# Patient Record
Sex: FEMALE | Race: WHITE | Employment: FULL TIME | URBAN - METROPOLITAN AREA
[De-identification: names, ages, dates, MRNs, and addresses within clinical notes are randomized per-mention and may not be internally consistent; named-entity substitution may affect disease eponyms.]

---

## 2018-11-17 RX ORDER — SERTRALINE HYDROCHLORIDE 50 MG/1
50 TABLET, FILM COATED ORAL DAILY
Qty: 30 TAB | Refills: 0 | Status: SHIPPED | OUTPATIENT
Start: 2018-11-17 | End: 2022-09-06 | Stop reason: ALTCHOICE

## 2020-11-25 ENCOUNTER — VIRTUAL VISIT (OUTPATIENT)
Dept: SURGERY | Age: 45
End: 2020-11-25
Payer: COMMERCIAL

## 2020-11-25 VITALS — HEIGHT: 65 IN | WEIGHT: 293 LBS | BODY MASS INDEX: 48.82 KG/M2

## 2020-11-25 DIAGNOSIS — E66.01 MORBID OBESITY WITH BODY MASS INDEX (BMI) OF 50.0 TO 59.9 IN ADULT (HCC): Primary | ICD-10-CM

## 2020-11-25 PROCEDURE — 99203 OFFICE O/P NEW LOW 30 MIN: CPT | Performed by: SURGERY

## 2020-11-25 RX ORDER — NORETHINDRONE 0.35 MG/1
TABLET ORAL
COMMUNITY
Start: 2020-08-27

## 2020-11-25 NOTE — PROGRESS NOTES
Bariatric Surgery Consult    Audrey Tolliver is a 38 y.o. female with a history of morbid obesity. Her Height: 5' 5\" (165.1 cm), Weight: 315 lb (142.9 kg). Body mass index is 52.42 kg/m². She reports that she has been trying to lose weight for 5 years. Her maximum weight was 315 pounds. She has attended our bariatric surgery information seminar. Gloria Mcconnell wants to consider laparoscopic gastric bypass surgery and laparoscopic sleeve gastrectomy. Pt is referred by:  Rosa Maria Cardoza MD.    Dietary History:   The patient says that in the past, physician supervised, behavior modification, unsupervised diets and Weight Watchers have not resulted in real success. When asked why she was not able to achieve or maintain significant weight loss she replied, \" she has tried to lose weight many times is been able to lose about 20 to 30 pounds with other weight loss attempts but not been able to keep the weight off any more than a few months to year. \". Number of meals per day: 3  Portion size: moderate  Snacks: A few snacks per day     Comorbidities:     Bariatric comorbidities present: chronic back problems    Ambulatory status: independent    The patient's reported level of exercise: moderately active.       Patient Active Problem List    Diagnosis Date Noted    Pregnancy 06/08/2015    Ectopic pregnancy 04/07/2014    Nausea and vomiting in pregnancy 04/07/2014    Active labor 06/03/2012     Past Medical History:   Diagnosis Date    Chlamydia     Essential hypertension     Psychiatric problem       Past Surgical History:   Procedure Laterality Date    HX GYN      2 vaginal births    HX GYN  4/2/14    DILATATION AND CURETTAGE WITH SUCTION / SEND PATH FOR FROZEN SECTION     HX OTHER SURGICAL      ovarian cyst 2007      Social History     Tobacco Use    Smoking status: Current Some Day Smoker     Packs/day: 0.25    Smokeless tobacco: Never Used   Substance Use Topics    Alcohol use: Yes     Comment: occas Family History   Problem Relation Age of Onset    Diabetes Father     Hypertension Father     Stroke Father     Cancer Maternal Grandmother     Cancer Paternal Aunt       . Current Outpatient Medications   Medication Sig    Shanna 0.35 mg tab     sertraline (ZOLOFT) 50 mg tablet Take 1 Tab by mouth daily.  oxyCODONE-acetaminophen (PERCOCET) 5-325 mg per tablet Take 1 Tab by mouth every four (4) hours as needed for Pain. Max Daily Amount: 6 Tabs.  ibuprofen (MOTRIN) 600 mg tablet Take 1 Tab by mouth every eight (8) hours as needed for Pain.  PNV no.24-iron-folic acid-dha (PRENATAL DHA+COMPLETE PRENATAL) -300 mg-mcg-mg cmpk Take  by mouth.  calcium carbonate (TUMS) 200 mg calcium (500 mg) chew Take 2 Tabs by mouth three (3) times daily. No current facility-administered medications for this visit. Allergies   Allergen Reactions    Other Food Nausea and Vomiting     Oysters causes nausea &  vomiting         Review of Systems:    Constitutional: negative  Ears, Nose, Mouth, Throat, and Face: negative  Respiratory: negative  Cardiovascular: negative  Gastrointestinal: negative  Genitourinary:negative  Integument/Breast: negative  Hematologic/Lymphatic: negative  Musculoskeletal:negative  Neurological: negative  Behavioral/Psychiatric: negative  Endocrine: negative  Allergic/Immunologic: negative    Objective:     Visit Vitals  Ht 5' 5\" (1.651 m)   Wt 315 lb (142.9 kg)   BMI 52.42 kg/m²        Physical Exam:    No physical exam due to virtual visit  Assessment:     1. Morbid obesity (Body mass index is 52.42 kg/m².) with multiple comorbidities. The patient meets criteria established by the NIH for weight loss surgery candidates. Without weight reduction, co-morbidities will escalate as well as increase risk of early mortality. Our recommendation is the patient could be served with laparoscopic gastric bypass surgery and laparoscopic sleeve gastrectomy.  I explained to the patient differences between laparoscopic gastric bypass, laparoscopic adjustable gastric banding, and laparoscopic vertical sleeve gastrectomy with respect to expected weight loss, resolution of comorbidities and risks. Ms. Pascual has attended one our informational meetings and has seen our educational materials. She has requested Dr. Usama Perez to perform her procedure. I reviewed the role for this procedure as a tool to help her achieve her weight loss goals. I reminded her that effective weight loss comes from lifelong adherence to changes in dietary choices, eating habits and exercise. Recommendation: We will request approval for laparoscopic gastric bypass surgery and laparoscopic sleeve gastrectomy. We recommend that the patient undergo the following evaluations prior to considering surgery:    Cardiology: no  Dietician: yes  Gastroenterology: no  Psychiatry/Psychology: yes  Pulmonology: yes  Sleep Medicine: no    She is not quite sure what surgery she would like to have. I believe she would be a good candidate for either sleeve or gastric bypass. Has no GERD but we will get an upper GI study to make sure that there are no issues with the stomach or esophagus. She will start her bariatric surgery process with the psychological evaluation and nutrition evaluation. She will come back to see me in a few months and figure out which surgery she would like to have. She is also a smoker and will need to smoke free for 3 months and have cotinine testing    I was in the office while conducting this encounter. Consent:  She and/or her healthcare decision maker is aware that this patient-initiated Telehealth encounter is a billable service, with coverage as determined by her insurance carrier. She is aware that she may receive a bill and has provided verbal consent to proceed: Yes    This virtual visit was conducted via [employer] me.   Pursuant to the emergency declaration under the [address] Emergencies Act, 1135 waiver authority and the Coronavirus Preparedness and Response Supplemental Appropriations Act, this Virtual  Visit was conducted to reduce the patient's risk of exposure to COVID-19 and provide continuity of care for an established patient. Services were provided through a video synchronous discussion virtually to substitute for in-person clinic visit. Due to this being a TeleHealth evaluation, many elements of the physical examination are unable to be assessed. Total Time: minutes: 30. Signed By: Antony Waters MD     November 25, 2020       Greater than half of the time: 30 minutes was used in counciling the patient about bariatric surgery and the steps she needs to take to move forward with her surgery. Ms. Pascual has a reminder for a \"due or due soon\" health maintenance. I have asked that she contact her primary care provider for follow-up on this health maintenance.

## 2020-11-25 NOTE — PROGRESS NOTES
1. Have you been to the ER, urgent care clinic since your last visit? Hospitalized since your last visit? No     2. Have you seen or consulted any other health care providers outside of the [address] [place] [address] since your last visit? Include any pap smears or colon screening.   No

## 2021-05-03 ENCOUNTER — CLINICAL SUPPORT (OUTPATIENT)
Dept: SURGERY | Age: 46
End: 2021-05-03

## 2021-05-03 DIAGNOSIS — E66.01 MORBID OBESITY WITH BODY MASS INDEX (BMI) OF 50.0 TO 59.9 IN ADULT (HCC): Primary | ICD-10-CM

## 2021-05-03 NOTE — PROGRESS NOTES
Pre-operative Bariatric Nutrition Evaluation ()     Date: 5/3/2021   Kelsie Lewis M.D. Name: Osbaldo Roa  :  1975  Age:  41  Gender: Female   Type of Surgery: [x]           Gastric Bypass   [x]           Sleeve Gastrectomy    ASSESSMENT:    Past Medical History:depression, anxiety; smoking 1/2 ppd      Medications/Supplements:   Prior to Admission medications    Medication Sig Start Date End Date Taking? Authorizing Provider   Shanna 0.35 mg tab  20   Provider, Historical   sertraline (ZOLOFT) 50 mg tablet Take 1 Tab by mouth daily. 18   Carlo Servin MD   oxyCODONE-acetaminophen (PERCOCET) 5-325 mg per tablet Take 1 Tab by mouth every four (4) hours as needed for Pain. Max Daily Amount: 6 Tabs. 6/10/15   Omi Palmer MD   ibuprofen (MOTRIN) 600 mg tablet Take 1 Tab by mouth every eight (8) hours as needed for Pain. 6/10/15   Omi Palmer MD   PNV no.93-ejsh-akaia acid-dha (PRENATAL DHA+COMPLETE PRENATAL) -684 mg-mcg-mg cmpk Take  by mouth. Provider, Historical   calcium carbonate (TUMS) 200 mg calcium (500 mg) chew Take 2 Tabs by mouth three (3) times daily. Provider, Historical       Food Allergies/Intolerances:none    Anthropometrics:    Ht:65\"   Reported Wt: 321#    IBW: 125#    %IBW: 256%     BMI:52    Category: obesity III     Reported wt history: Pt completing pre-op nutrition evaluation for wt loss surgery over the phone d/t social distancing guidelines d/t COVID-19. Pt reports strong family h/o overweight/obesity and has struggled with her wt most of her life. Reports lowest adult BW of 168# and highest adult BW of 320#. Attributes wt gain over the years r/t family h/o obesity, sedentary lifestyle . Has attempted wt loss through various methods with most successful wt loss of 15-20#. Has been unable to maintain long term or significant wt loss and is now seeking approval for weight loss surgery.  Pt will need to complete 6 months of supervised weight loss for insurance requirements. Exercise/Physical Activity:limited d/t pain and SOB    Reported Diet History:Atkins, Weight Watchers, liquid diets, diet pills    24 Hour Diet Recall  Breakfast  Coffee, half bagel with banana; skips    Lunch  Salad; starving or eating \"whatever\" and eating quickly - pizza, chips   Dinner  Meat and vegetable; pizza, tacos, spaghetti, Ramen Noodles   Snacks  Pickles, chips, grapes, yogurt; no sweets    Beverages  1 diet soda per day, coffee, water, Crystal Light       Environment/Psychosocial/Support:Pt reports a good support system from  and they have two sons. Pt and  share grocery shopping and cooking. Pt works for [employer] and has been working from home. Pt helps care for her mother with dementia. Pt knows a few people who have had wt loss surgery. NUTRITION DIAGNOSIS:  1. Inadequate protein intake r/t skipping meals evidenced by pt with less than 60 grams protein per day. 2. Food and nutrition related knowledge deficit r/t lack of prior exposure to information evidenced by pt seeking nutrition education for wt loss surgery. NUTRITION INTERVENTION:  Pt educated on nutrition recommendations for weight loss surgery, specifically undecided. Instructed on consuming 3 meals per day starting now. Use the balanced plate method to plan meals, include 3 oz of lean source of protein, 1/2 cup whole grains, unlimited non-starchy vegetables, 1/2 cup fruit and 1 serving of low fat dairy. Utilize handouts listing healthy snack and meal ideas to limit restaurant meals. After surgery measure all meals to 1/2 cup. Each meal will contain a 1/4 cup lean protein and 1/4 cup fruit, non-starchy vegetable or starch (limiting to once per day). Aim for 60 g protein per day. Sip on 48-64 oz of sugar free, calorie free, non-carbonated beverages each day. Do not use a straw. Do not consume beverages 30 minutes before, during or 30 minutes after meals. Read all nutrition labels. Demonstrated and emphasized identifying serving size, total fat, sugar and protein content. Defined low fat as </= 3 g per serving. Discussed lean and extra lean sources of protein. Provided list of low fat cooking methods. Avoid foods with sugar listed in the first 3 ingredients and >/15 g sugar per serving. Excess sugar/fat intake may lead to dumping syndrome. Discussed signs and symptoms of dumping syndrome. Practice mindful eating habits; take small bites, chew thoroughly, avoid distractions, utilize hunger/fullness scale. Consume meals over 20-30 minutes. Attend Bariatric Support Group and increase physical activity (approved per MD) for long term weight maintenance. NUTRITION MONITORING AND EVALUATION:    The following goals were established with patient;  1. Aim for 60 grams protein per day. Eat 3 meals a day to ensure adequate protein intake. Use protein shakes. 2. Eliminate carbonated beverages. 3. Review nutrition education materials. Follow up next month for continued nutrition education and supervised weight loss. Specific tips and techniques to facilitate compliance with above recommendations were provided and discussed. Nutrition evaluation reveals important lifestyle changes indicated. Goals set and recommendations made. Will continue to assess. If further details are desired please feel free to contact me at [phone]. This phone number was also provided to the patient for any further questions or concerns.            Kenyatta Wilson RD

## 2021-06-02 ENCOUNTER — OFFICE VISIT (OUTPATIENT)
Dept: SURGERY | Age: 46
End: 2021-06-02

## 2021-06-02 DIAGNOSIS — E66.01 MORBID OBESITY WITH BODY MASS INDEX (BMI) OF 50.0 TO 59.9 IN ADULT (HCC): Primary | ICD-10-CM

## 2021-06-04 NOTE — PROGRESS NOTES
[address] [place] [address] [facility]  Supervised Weight Loss     Date:   2021    Patient's Name: Berkley Lopez  : 1975    Insurance:  [employer]              Session:   Surgery: Gastric Bypass  Surgeon:  Mark Baez M.D. Height: 65\"  Reported Weight:    318#      Lbs. BMI: 52   Pounds Lost since last month: 3#               Pounds Gained since last month: 0    Starting Weight: 321#   Previous Months Weight: 321#  Overall Pounds Lost: 3#  Overall Pounds Gained: 0    Other Pertinent Information: Today's appointment was completed in a virtual setting d/t COVID-19. Today's wt was self-reported. Smoking Status:  yes  Alcohol Intake: 1-2 drinks, 1 time per week    I have reviewed with pt the guidelines of the supervised wt loss program.  Pt understands the expectations of some wt loss during the program and that wt gain could delay the process. I have also explained that classes need to be consecutive. Missing a class may result in starting over. Pt has received this information in writing. Changes that patient has made since last month include:  Drinking more water, healthier food choices, increased veggies. Eating Habits and Behaviors  General healthy eating guidelines were discussed. A nutrition lesson specific to the importance of protein intake after surgery was provided. We discussed food sources of protein, protein supplements and multiple reasons as to why protein is important after bariatric surgery. Pts were instructed to focus on including protein at every meal and practice eating protein first at the meal. Pts were encouraged to sample a protein shake for tolerance. Patients were also instructed to use the balanced plate method for help with portion control and general healthy eating prior to surgery. We discussed measuring meals to 1/2 cup total per meal after surgery. Drinking only calorie-free, sugar-free and non-carbonated beverages.  We discussed the importance of drinking 64 ounces of fluid per day to prevent dehydration post-operatively. Patient's current diet habits include: Pt is eating 2-3 meals per day. Snack choices include carrots, celery, pickles, cheese, crackers. Pt is eating refined carbohydrate foods (bread, pasta, rice, potatoes) several times per week. Pt is eating sweets/desserts special occasions. Pt is using baked, grilled, broiled and some fried cooking methods. Pt is eating meals prepared outside of the home 1-3 times per week. Pt is drinking water, sweetened tea, diet soda, Crystal Light. Pt reports yes to emotional eating. Physical Activity/Exercise  We talked about the importance of increasing daily physical activity and beginning to develop an exercise regimen/routine. We talked about exercise as being an important part of long term weight loss after surgery. Comments:  During class, I discussed with patient the importance of getting into an exercise routine. Pt is currently walking for 20 minutes, 3 times per week for activity. Pt has been encouraged to maintain and increase as tolerated. Behavior Modification       We talked about how to eat more mindfully. Tips and recommendations for how to make these changes were provided. Pt was encouraged to keep a food journal and record what they were taking in daily. Overall Assessment: Pt demonstrates appropriate lifestyle changes evidenced by reported changes and reported wt loss. Will continue to assess. Patient-Set Goals:   1. Nutrition - protein at every meal  2. Exercise - walking 5 times per week  3.  Behavior -make time for it    Kyle Betts, MY  6/4/2021

## 2021-07-01 ENCOUNTER — OFFICE VISIT (OUTPATIENT)
Dept: SURGERY | Age: 46
End: 2021-07-01

## 2021-07-01 DIAGNOSIS — E66.01 MORBID OBESITY WITH BODY MASS INDEX (BMI) OF 50.0 TO 59.9 IN ADULT (HCC): Primary | ICD-10-CM

## 2021-08-03 ENCOUNTER — HOSPITAL ENCOUNTER (OUTPATIENT)
Dept: GENERAL RADIOLOGY | Age: 46
Discharge: HOME | End: 2021-08-03
Attending: SURGERY
Payer: COMMERCIAL

## 2021-08-03 DIAGNOSIS — E66.01 MORBID OBESITY WITH BODY MASS INDEX (BMI) OF 50.0 TO 59.9 IN ADULT (HCC): ICD-10-CM

## 2021-08-03 PROCEDURE — 74240 X-RAY XM UPR GI TRC 1CNTRST: CPT

## 2021-08-05 ENCOUNTER — OFFICE VISIT (OUTPATIENT)
Dept: SURGERY | Age: 46
End: 2021-08-05

## 2021-08-05 DIAGNOSIS — E66.01 MORBID OBESITY (HCC): Primary | ICD-10-CM

## 2021-08-05 NOTE — PROGRESS NOTES
[facility]  Supervised Weight Loss     Date:   2021    Patient's Name: Jessica Solorzano  : 1975    Insurance:  [employer]         Session:   Surgery: Gastric Bypass                  Surgeon:  Earl Bowen MD     Height: 65\"     Reported Weight:  315     Lbs. BMI: 52             Pounds Lost since last month: 4.5 lbs               Pounds Gained since last month: 0     Starting Weight: 321#                       Previous Months Weight: 319.5#  Overall Pounds Lost: 6 lbs  Overall Pounds Gained: 0    Other Pertinent Information: Today's appointment was completed in a virtual setting d/t COVID-19. Smoking Status: None  Alcohol Intake: 2 drinks once a week    I have reviewed with pt the guidelines of the supervised wt loss program.  Pt understands the expectations of some wt loss during the program and that wt gain could delay the process. I have also explained that appointments need to be consecutive and missing an appointment may result in starting over. Pt has received this information in writing. Changes that patient has made since last month include: walking, drinking water. Eating Habits and Behaviors  General healthy eating guidelines were also discussed. Pts were instructed that their plate should be made up 1/2 plate coming from non-starchy vegetables, 1/4 coming from lean meat, and 1/4 of their plate coming from carbohydrates, including fruits, starches, or milk. We discussed measuring meals to 1/2 cup total per meal after surgery. Drinking only calorie-free, sugar-free and non-carbonated beverages. We discussed the importance of drinking 64 ounces of fluid per day to prevent dehydration post-operatively. Physical Activity/Exercise  We talked about the importance of increasing daily physical activity and beginning to develop an exercise regimen/routine.  We talked about exercise as being an important part of long term weight loss after surgery. Comments:  During class, I discussed with patient the importance of getting into an exercise routine. Pt is currently walking 5x week for 20 min for activity. Pt has been encouraged to increase duration of exercise. Behavior Modification    A behavior modification lesson was provided with an emphasis on developing mindful eating behaviors. We talked about how to eat more mindfully and identify emotional eating triggers. Tips and recommendations for how to make these changes were provided. Pt was encouraged to keep a food journal and record what they were taking in daily. Patient's reported eating behaviors: emotional eating, not packing meals when away from home, skipping meals. Biggest trigger when managing weight is food choices and lack of activity. Overall Assessment: Nutrition evaluation reveals important lifestyle changes being made but more are indicated. Goals set and recommendations made. Will continue to assess    Patient-Set Goals:   1. Nutrition - continue drinking water, watch portion sizes  2. Exercise - exercise 5x week for 30 min  3.  Behavior - no emotional eating- sub in with non food activity    April Duval, MY  8/5/2021

## 2021-09-02 ENCOUNTER — OFFICE VISIT (OUTPATIENT)
Dept: SURGERY | Age: 46
End: 2021-09-02

## 2021-09-02 DIAGNOSIS — E66.01 MORBID OBESITY (HCC): Primary | ICD-10-CM

## 2021-09-03 NOTE — PROGRESS NOTES
[facility]  Supervised Weight Loss     Date:   2021    Patient's Name: Tara Sherwood  : 1975    Insurance:  [employer]         Session: Harshal Lennon  Surgery: Gastric Bypass                  Surgeon: Ann Marie Garcia MD     Height: 65\"     Reported NNARXS:  367     OLD.                            BMI: 51.7            Pounds Lost since last month: 5 lbs               Pounds Gained since last month: 0     Starting Weight: 321#                       Previous Months Weight: 315#  Overall Pounds Lost: 11 lbs  Overall Pounds Gained: 0  Other Pertinent Information: Today's appointment was completed in a virtual setting d/t [email or web address]. Smoking Status:  None-stopped smoking 3 weeks ago  Alcohol Intake: 1 drink once per week    I have reviewed with pt the guidelines of the supervised wt loss program.  Pt understands the expectations of some wt loss during the program and that wt gain could delay the process. I have also explained that appointments need to be consecutive and missing an appointment may result in starting over. Pt has received this information in writing. Changes that patient has made since last month include:  Stopped smoking, watching portion sizes, drinking water consistently . Eating Habits and Behaviors  General healthy eating guidelines were discussed. A nutrition lesson was presented on portion control. Patients were instructed implement portion control now using the balanced plate method (1/2 plate non-starchy vegetables, 1/4 plate lean meat, and 1/4 plate whole grains and to include fruit and/or milk at meals or snack). We discussed measuring meals to 1/2 cup total per meal after surgery and appropriate portion progression long term. Patient's current diet habits include: Pt is eating 3 meals per day. Snack choices include veggies, yogurt, nuts and a little chips.  Pt is eating refined carbohydrate foods (bread, pasta, rice, potatoes) 1x week. Pt is not eating sweets/desserts. Pt is using baking, grilling and broiling cooking methods. Pt is eating meals prepared outside of the home 1-3x week. Pt is drinking 64 oz water, 12 oz sugar free beverages. Pt reports some emotional eating at times. Physical Activity/Exercise  We talked about the importance of increasing daily physical activity and beginning to develop an exercise regimen/routine. We talked about exercise as being an important part of long term weight loss after surgery. Comments:  During class, I discussed with patient the importance of getting into an exercise routine. Pt is currently not exercising due to depression from losing mother and from [place] (out of breath) for activity. Pt has been encouraged to restart exercise when able. Behavior Modification    We talked about how to eat more mindfully. Tips and recommendations for how to make these changes were provided. Pt was encouraged to keep a food journal and record what they were taking in daily. Overall Assessment: Nutrition evaluation reveals important lifestyle changes being made, along with weight loss. Goals set and recommendations made. Will continue to assess    Patient-Set Goals:   1. Nutrition - drink 1 protein shake a day; watch protein amounts per day  2. Exercise - get back to walking once per day  3.  Behavior -continue to not smoke-keep smoking tracker going    Josh Roth, [address]  9/2/2021

## 2021-10-07 ENCOUNTER — OFFICE VISIT (OUTPATIENT)
Dept: SURGERY | Age: 46
End: 2021-10-07

## 2021-10-07 DIAGNOSIS — E66.01 MORBID OBESITY (HCC): Primary | ICD-10-CM

## 2021-10-12 NOTE — PROGRESS NOTES
[facility] at [facility]  Supervised Weight Loss     Date:   10/7/2021    Patient's Name: Shelbie Reddy  : 1975    Insurance:  [place]         Session:   Surgery: Gastric Bypass                  Surgeon: Maral Sorensen MD     Height: 65\"     Reported BDGWPD:  096     DGM.                            BMI: 52          Pounds Lost since last month: 0               Pounds Gained since last month: 5 lbs     Starting Weight: 321#                       Previous Months Weight: 310#  Overall Pounds Lost: 6 lbs  Overall Pounds Gained: 0    Other Pertinent Information: Today's appointment was completed in a virtual setting d/t COVID-19.        Smoking Status: None  Alcohol Intake: 2 drinks every 2 weeks    I have reviewed with pt the guidelines of the supervised wt loss program.  Pt understands the expectations of some wt loss during the program and that wt gain could delay the process. I have also explained that appointments need to be consecutive and missing an appointment may result in starting over. Pt has received this information in writing. Changes that patient has made since last month include: measuring out food portions; counting out grams of protein; working on water intake. Eating Habits and Behaviors  General healthy eating guidelines were also discussed. Pts were instructed that their plate should be made up 1/2 plate coming from non-starchy vegetables, 1/4 coming from lean meat, and 1/4 of their plate coming from carbohydrates, including fruits, starches, or milk. We discussed measuring meals to 1/2 cup total per meal after surgery. Drinking only calorie-free, sugar-free and non-carbonated beverages. We discussed the importance of drinking 64 ounces of fluid per day to prevent dehydration post-operatively. Patient's current diet habits include: Pt is eating 2-3 meals per day. Snack choices include cheese, greek yogurt and fruit.  Pt is eating refined carbohydrate foods (pasta, rice) 1x week. Pt is eating sweets/desserts 1x week. Pt is using baking, grilling and broiling cooking methods. Pt is eating meals prepared outside of the home 1x week. Pt is drinking water and sugar free beverages. Pt reports some emotional eating. Physical Activity/Exercise  An exercise presentation was provided including information about exercise programs available both before and after surgery. We talked about the importance of increasing daily physical activity and beginning to develop an exercise regimen/routine. We talked about exercise as being an important part of long term weight loss after surgery. Comments:  During class, I discussed with patient the importance of getting into an exercise routine. Pt is currently not exercising due to injured foot. Pt has been encouraged to restart exercise when foot healed. Behavior Modification       We talked about how to eat more mindfully. Tips and recommendations for how to make these changes were provided. Pt was encouraged to keep a food journal and record what they were taking in daily. Overall Assessment:     Pt demonstrates appropriate lifestyle changes evidenced by reported changes and slight weight loss over the past 6 months. Demonstrates understanding of nutrition guidelines for bariatric surgery. Appears to be an appropriate candidate at this time      Patient-Set Goals:   1. Nutrition - continue monitoring portion sizes and counting out protein  2. Exercise - walk 30 min daily when foot healed  3.  Behavior - stop emotional eating-use mindfulness; listen to body to know if really hungry    Arnold Shaw, MY  10/7/2021

## 2021-11-04 ENCOUNTER — OFFICE VISIT (OUTPATIENT)
Dept: SURGERY | Age: 46
End: 2021-11-04

## 2021-11-04 DIAGNOSIS — E66.01 MORBID OBESITY (HCC): Primary | ICD-10-CM

## 2021-11-10 NOTE — PROGRESS NOTES
[facility] Surgical Specialists at [facility]  Supervised Weight Loss     Date:   2021    Patient's Name: Sanya Laurent  : 1975    Insurance:  [place]         Session:   Surgery: Gastric Bypass                  Surgeon: Julian Mills MD     Height: 65\"     Reported PHFRWP:  819     UJY.                            BMI: 38          Pounds Lost since last month: 0               Pounds Gained since last month: 0     Starting Weight: 321#                       Previous Months Weight: 315#  Overall Pounds Lost: 6 lbs  Overall Pounds Gained: 0       Other Pertinent Information: Today's appointment was completed in a virtual setting d/t COVID-[employer] Smoking Status:  None  Alcohol Intake: 2 drinks, once per week    I have reviewed with pt the guidelines of the supervised wt loss program.  Pt understands the expectations of some wt loss during the program and that wt gain could delay the process. I have also explained that appointments need to be consecutive and missing an appointment may result in starting over. Pt has received this information in writing. Changes that patient has made since last month include: not eating in front of TV; finding things to do instead of snacking; continue to attempt to walk for exercise. Eating Habits and Behaviors  A nutrition lesson was presented on label reading with specific guidelines provided for limiting added sugars. This information will help increase healthy food choices, promote weight loss and prevent dumping syndrome after gastric bypass. We also reviewed the general nutrition guidelines for bariatric surgery. Patient's current diet habits include: Pt is eating 2-3 meals per day. Snack choices include apples, carrots, walnuts, dark chocolate chips. Pt is eating refined carbohydrate foods (chips, pretzels, pasta) 1-2x week. Pt is eating sweets/desserts occasionally.  Pt is using baking, grilling and broiling  cooking methods. Pt is eating meals prepared outside of the home 1x week. Pt is drinking water, caffeinated beverages and sugar free beverages. Pt reports emotional eating. Physical Activity/Exercise  We talked about the importance of increasing daily physical activity and beginning to develop an exercise regimen/routine. We talked about exercise as being an important part of long term weight loss after surgery. Comments:  During class, I discussed with patient the importance of getting into an exercise routine. Pt is currently walking 2x week for 20 min for activity. Pt has been encouraged to increase frequency and duration of exercise as able. Behavior Modification       We talked about how to eat more mindfully. Tips and recommendations for how to make these changes were provided. Pt was encouraged to keep a food journal and record what they were taking in daily. Overall Assessment:   Pt demonstrates appropriate lifestyle changes evidenced by reported changes and slight weight loss over the past 6 months. Demonstrates understanding of nutrition guidelines for bariatric surgery. Appears to be an appropriate candidate at this time. Will continue to follow up for monthly classes until time of surgery.         Patient-Set Goals:   1. Nutrition - hit protein goal- protein at each meal; track food intake  2. Exercise - walk as many days as possible-set a time for a daily routine  3.  Behavior - work on mindful eating- eat slow and listen to body when full    Fuentes Mata, [address]  11/4/2021

## 2021-11-29 ENCOUNTER — TELEPHONE (OUTPATIENT)
Dept: SURGERY | Age: 46
End: 2021-11-29

## 2021-11-29 NOTE — TELEPHONE ENCOUNTER
Returned pts call to reschedule her appt today with Dr Kaiden Mccain. Her appt for today is at 2:15pm.    No answer, lvm to return call.

## 2021-12-16 ENCOUNTER — OFFICE VISIT (OUTPATIENT)
Dept: SURGERY | Age: 46
End: 2021-12-16

## 2021-12-16 DIAGNOSIS — E66.01 MORBID OBESITY (HCC): Primary | ICD-10-CM

## 2021-12-26 NOTE — PROGRESS NOTES
[facility] Surgical Specialists at [facility]  Supervised Weight Loss     Date:   2021    Patient's Name: Collis Aase  : 1975    Insurance:  [place]         Session:   Surgery: Gastric Bypass                  Surgeon: Kirk Clifton MD     Height: 65\"     Reported DEBXOU:  788   XBF (last months wt)                        BMI: 99          Pounds Lost since last month: 0               Pounds Gained since last month: 0     Starting Weight: 321#                       Previous Months Weight: 315#  Overall Pounds Lost: 6 lbs  Overall Pounds Gained: 0    Other Pertinent Information: Today's appointment was completed in a virtual setting d/t COVID-19. Smoking Status:  Not reported  Alcohol Intake: not reported    I have reviewed with pt the guidelines of the supervised wt loss program.  Pt understands the expectations of some wt loss during the program and that wt gain could delay the process. I have also explained that classes need to be consecutive. Missing a class may result in starting over. Pt has received this information in writing. Changes that patient has made since last month include: Pt did not complete the diet and lifestyle questionnaire despite reminders, therefore, no lifestyle changes were reported      Eating Habits and Behaviors  General healthy eating guidelines were discussed. A nutrition lesson specific to the importance of protein intake after surgery was provided. We discussed food sources of protein, protein supplements and multiple reasons as to why protein is important after bariatric surgery. Pts were instructed to focus on including protein at every meal and practice eating protein first at the meal. Pts were encouraged to sample a protein shake for tolerance. Patients were also instructed to use the balanced plate method for help with portion control and general healthy eating prior to surgery.  We discussed measuring meals to 1/2 cup total per meal after surgery. Drinking only calorie-free, sugar-free and non-carbonated beverages. We discussed the importance of drinking 64 ounces of fluid per day to prevent dehydration post-operatively. Patient's current diet habits include:  Pt did not complete the diet and lifestyle questionnaire for today's appointment, therefore, current eating habits were not reported. Physical Activity/Exercise  We talked about the importance of increasing daily physical activity and beginning to develop an exercise regimen/routine. We talked about exercise as being an important part of long term weight loss after surgery. Comments:  During class, I discussed with patient the importance of getting into an exercise routine. Behavior Modification     We talked about how to eat more mindfully. Tips and recommendations for how to make these changes were provided. Pt was encouraged to keep a food journal and record what they were taking in daily. Overall Assessment: Pt demonstrates appropriate lifestyle changes evidenced by reported changes and slight weight loss over the past 8 months. Demonstrates understanding of nutrition guidelines for bariatric surgery. Appears to be an appropriate candidate at this time. Will continue to follow up for monthly classes until time of surgery. Patient-Set Goals:   1. Nutrition - None reported  2. Exercise - None reported  3.  Behavior - None reported    Jennifer Arshad, MY  12/16/2021

## 2022-01-18 ENCOUNTER — OFFICE VISIT (OUTPATIENT)
Dept: SURGERY | Age: 47
End: 2022-01-18

## 2022-01-18 DIAGNOSIS — E66.01 MORBID OBESITY (HCC): Primary | ICD-10-CM

## 2022-01-18 NOTE — PROGRESS NOTES
[employer] [facility]ists at [facility]  Supervised Weight Loss     Date:   2022    Patient's Name: Jacek Mo  : 1975    Insurance:  [place]         Session:   Surgery: Gastric Bypass                  Surgeon: Bulmaro Bond MD     Height: 65\"     Reported YCOVHX:  455   IAL                         BMI: 64          Pounds Lost since last month: 0               Pounds Gained since last month: 17 lbs     Starting Weight: 321#                       Previous Months Weight: 315#  Overall Pounds Lost: 11 lbs  Overall Pounds Gained: 0       Other Pertinent Information: Today's appointment was completed in a virtual setting d/t COVID-19. Smoking Status:  None  Alcohol Intake: None    I have reviewed with pt the guidelines of the supervised wt loss program.  Pt understands the expectations of some wt loss during the program and that wt gain could delay the process. I have also explained that appointments need to be consecutive and missing an appointment may result in starting over. Pt has received this information in writing. Changes that patient has made since last month include:  Continued with no smoking, drinking water. Eating Habits and Behaviors  A nutrition lesson specific to vitamins was provided. We discussed the various reasons for needing vitamins and different types and doses. General healthy eating guidelines were also discussed. Pts were instructed that their plate should be made up 1/2 plate coming from non-starchy vegetables, 1/4 coming from lean meat, and 1/4 of their plate coming from carbohydrates, including fruits, starches, or milk. We discussed measuring meals to 1/2 cup total per meal after surgery. Drinking only calorie-free, sugar-free and non-carbonated beverages. We discussed the importance of drinking 64 ounces of fluid per day to prevent dehydration post-operatively.                        Patient's current diet habits include: Pt is eating 2-3 meals per day. Snack choices include oranges, chips/salsa. Pt is eating refined carbohydrate foods (bread, pasta, rice, potatoes) 1x week. Pt is not eating sweets/desserts. Pt is using baking, grilling and broiling cooking methods. Pt is eating meals prepared outside of the home 1-3x week. Pt is drinking water, crystal lite. Pt reports sometimes emotionally eating. Physical Activity/Exercise  We talked about the importance of increasing daily physical activity and beginning to develop an exercise regimen/routine. We talked about exercise as being an important part of long term weight loss after surgery. Comments:  During class, I discussed with patient the importance of getting into an exercise routine. Pt is currently not exercising due to ankle injury. Pt has been encouraged to restart exercise when able. Behavior Modification       We talked about how to eat more mindfully and identify emotional eating triggers. Tips and recommendations for how to make these changes were provided. Pt was encouraged to keep a food journal and record what they were taking in daily. Overall Assessment:  Pt demonstrates appropriate lifestyle changes evidenced by reported changes and but some weight gain over the past month. Demonstrates understanding of nutrition guidelines for bariatric surgery. Appears to be an appropriate candidate at this time. Will continue to follow up for monthly classes until time of surgery.     Patient-Set Goals:   1. Nutrition - focus on protein intake- plan meals that are high in protein; pay attention to healthier options when eating out  2. Exercise - take a walk when kids get off the bus  3.  Behavior -replace starch with [place] [address]  1/18/2022

## 2022-02-01 ENCOUNTER — TELEPHONE (OUTPATIENT)
Dept: SURGERY | Age: 47
End: 2022-02-01

## 2022-02-02 ENCOUNTER — OFFICE VISIT (OUTPATIENT)
Dept: SURGERY | Age: 47
End: 2022-02-02
Payer: COMMERCIAL

## 2022-02-02 VITALS
HEART RATE: 97 BPM | OXYGEN SATURATION: 97 % | SYSTOLIC BLOOD PRESSURE: 124 MMHG | RESPIRATION RATE: 18 BRPM | DIASTOLIC BLOOD PRESSURE: 85 MMHG | HEIGHT: 66 IN | TEMPERATURE: 99.8 F | WEIGHT: 293 LBS | BODY MASS INDEX: 47.09 KG/M2

## 2022-02-02 DIAGNOSIS — E66.01 MORBID OBESITY WITH BODY MASS INDEX (BMI) OF 50.0 TO 59.9 IN ADULT (HCC): Primary | ICD-10-CM

## 2022-02-02 PROCEDURE — 99214 OFFICE O/P EST MOD 30 MIN: CPT | Performed by: SURGERY

## 2022-02-02 RX ORDER — LISINOPRIL 5 MG/1
5 TABLET ORAL DAILY
COMMUNITY

## 2022-02-02 NOTE — LETTER
2/2/2022    Patient: Keli Cho   YOB: 1975   Date of Visit: 2/2/2022     [place] [id number]  Via Fax: [phone]    Dear Richelle Barclay MD,      Thank you for referring Ms. Justina Brady to [facility] for evaluation. My notes for this consultation are attached. If you have questions, please do not hesitate to call me. I look forward to following your patient along with you.       Sincerely,    Misa Zhang MD

## 2022-02-02 NOTE — PROGRESS NOTES
1. Have you been to the ER, urgent care clinic since your last visit? Hospitalized since your last visit? No    2. Have you seen or consulted any other health care providers outside of the [address] [place] [address] since your last visit? Include any pap smears or colon screening.  No

## 2022-02-02 NOTE — PROGRESS NOTES
[facility] Surgical Specialists at [facility] Surgery History and Physical    History of Present Illness:      Alli Espinoza is a 40 y.o. female who has been undergoing the bariatric surgery process. She was initially unsure which surgery she wanted to have. She would like to have gastric bypass now. She has made her official decision. Unfortunately she has been gaining some weight recently. She has been through some stressful family situations of recent. She has been working to lose some of the weight but is still up about 25 pounds. Past Medical History:   Diagnosis Date    Chlamydia     Essential hypertension     Psychiatric problem        Past Surgical History:   Procedure Laterality Date    HX GYN      2 vaginal births    HX GYN  4/2/14    DILATATION AND CURETTAGE WITH SUCTION / SEND PATH FOR FROZEN SECTION     HX OTHER SURGICAL      ovarian cyst 2007         Current Outpatient Medications:     lisinopriL (PRINIVIL, ZESTRIL) 5 mg tablet, Take 5 mg by mouth daily. , Disp: , Rfl:     Shanna 0.35 mg tab, , Disp: , Rfl:     calcium carbonate (TUMS) 200 mg calcium (500 mg) chew, Take 2 Tabs by mouth three (3) times daily. , Disp: , Rfl:     sertraline (ZOLOFT) 50 mg tablet, Take 1 Tab by mouth daily. (Patient not taking: Reported on 2/2/2022), Disp: 30 Tab, Rfl: 0    oxyCODONE-acetaminophen (PERCOCET) 5-325 mg per tablet, Take 1 Tab by mouth every four (4) hours as needed for Pain. Max Daily Amount: 6 Tabs. (Patient not taking: Reported on 2/2/2022), Disp: 28 Tab, Rfl: 0    ibuprofen (MOTRIN) 600 mg tablet, Take 1 Tab by mouth every eight (8) hours as needed for Pain. (Patient not taking: Reported on 2/2/2022), Disp: 36 Tab, Rfl: 2    PNV no.24-iron-folic acid-dha (PRENATAL DHA+COMPLETE PRENATAL) -300 mg-mcg-mg cmpk, Take  by mouth.  (Patient not taking: Reported on 2/2/2022), Disp: , Rfl:     Allergies   Allergen Reactions    Other Food Nausea and Vomiting     Oysters causes nausea & vomiting       Social History     Socioeconomic History    Marital status:      Spouse name: Not on file    Number of children: Not on file    Years of education: Not on file    Highest education level: Not on file   Occupational History    Not on file   Tobacco Use    Smoking status: Current Some Day Smoker     Packs/day: 0.25    Smokeless tobacco: Never Used   Substance and Sexual Activity    Alcohol use: Yes     Comment: occas    Drug use: No    Sexual activity: Yes     Partners: Male     Birth control/protection: None   Other Topics Concern    Not on file   Social History Narrative    Not on file     Social Determinants of Health     Financial Resource Strain:     Difficulty of Paying Living Expenses: Not on file   Food Insecurity:     Worried About Running Out of Food in the Last Year: Not on file    Jamie of Food in the Last Year: Not on file   Transportation Needs:     Lack of Transportation (Medical): Not on file    Lack of Transportation (Non-Medical):  Not on file   Physical Activity:     Days of Exercise per Week: Not on file    Minutes of Exercise per Session: Not on file   Stress:     Feeling of Stress : Not on file   Social Connections:     Frequency of Communication with Friends and Family: Not on file    Frequency of Social Gatherings with Friends and Family: Not on file    Attends Mormonism Services: Not on file    Active Member of [address] [place] [address] [employer] or Organizations: Not on file    Attends Club or Organization Meetings: Not on file    Marital Status: Not on file   Intimate Partner Violence:     Fear of Current or Ex-Partner: Not on file    Emotionally Abused: Not on file    Physically Abused: Not on file    Sexually Abused: Not on file   Housing Stability:     Unable to Pay for Housing in the Last Year: Not on file    Number of [place] in the Last Year: Not on file    Unstable Housing in the Last Year: Not on file       Family History   Problem Relation Age of Onset  Diabetes Father     Hypertension Father     Stroke Father     Cancer Maternal Grandmother     Cancer Paternal Aunt        ROS   Constitutional: negative  Ears, Nose, Mouth, Throat, and Face: negative  Respiratory: negative  Cardiovascular: negative  Gastrointestinal: negative  Genitourinary:negative  Integument/Breast: negative  Hematologic/Lymphatic: negative  Behavioral/Psychiatric: negative  Allergic/Immunologic: negative      Physical Exam:     Visit Vitals  /85 (BP 1 Location: Right arm, BP Patient Position: Sitting, BP Cuff Size: Large adult)   Pulse 97   Temp 99.8 °F (37.7 °C) (Oral)   Resp 18   Ht 5' 6\" (1.676 m)   Wt 340 lb 12.8 oz (154.6 kg)   SpO2 97%   BMI 55.01 kg/m²       General - alert and oriented, no apparent distress  HEENT - no jaundice, no hearing imparement  Pulm - CTAB, no C/W/R  CV - RRR, no M/R/G  Abd -soft, nondistended, bowel sounds present, nontender to palpation  Ext - pulses intact in UE and LE bilaterally, no edema  Skin - supple, no rashes  Psychiatric - normal affect, good mood    Labs  Lab Results   Component Value Date/Time    Sodium 143 04/07/2014 01:05 PM    Potassium 3.6 04/07/2014 01:05 PM    Chloride 109 (H) 04/07/2014 01:05 PM    CO2 25 04/07/2014 01:05 PM    Anion gap 9 04/07/2014 01:05 PM    Glucose 77 04/07/2014 01:05 PM    BUN 7 04/07/2014 01:05 PM    Creatinine 0.51 04/07/2014 01:05 PM    BUN/Creatinine ratio 14 04/07/2014 01:05 PM    GFR est AA >60 04/07/2014 01:05 PM    GFR est non-AA >60 04/07/2014 01:05 PM    Calcium 8.3 (L) 04/07/2014 01:05 PM    Bilirubin, total 0.5 04/07/2014 01:05 PM    Alk.  phosphatase 75 04/07/2014 01:05 PM    Protein, total 6.4 04/07/2014 01:05 PM    Albumin 3.3 (L) 04/07/2014 01:05 PM    Globulin 3.1 04/07/2014 01:05 PM    A-G Ratio 1.1 04/07/2014 01:05 PM    ALT (SGPT) 35 04/07/2014 01:05 PM    AST (SGOT) 15 04/07/2014 01:05 PM     Lab Results   Component Value Date/Time    WBC 9.1 06/08/2015 07:26 AM    HGB 11.7 06/08/2015 07:26 AM    HCT 35.6 06/08/2015 07:26 AM    PLATELET 592 86/61/1273 07:26 AM    MCV 83.8 06/08/2015 07:26 AM         Imaging  Upper GI-The esophagus shows normal contour, caliber, peristalsis and mucosa with no  hiatal hernia or gastroesophageal reflux identified during this exam.      Gastric emptying is prompt. Gastric peristalsis and mucosa are normal in  appearance. The duodenal bulb shows no evidence of stricture or ulcer. The  duodenal sweep shows a small diverticulum in the region of the ampulla. The  visualized portions of the proximal small bowel are normal.     Air Kerma: 94 mGy.     IMPRESSION  Unremarkable Upper GI Exam.  I have reviewed and agree with all of the pertinent images    Assessment:     Valentino Hire is a 40 y.o. female with morbid obesity BMI 55    Recommendations:     1. Unfortunately she has gained about 25 pounds since her initial visit with us. She has had a lot of family stressors recently and has been having some eating issues. She has been working on some weight loss recently and she says she has lost about 15 pounds. She still about 25 pounds over her previous weight. I asked her to lose most of this weight before I can approve her for surgery. I would like her to lose at least 20 to 25 pounds and be in the range of three 15-3 20 before I can approve her for surgery. She otherwise looks good and has completed the process for surgery. When she has lost weight she will follow-up with me    Amber Camilo MD    Greater than half of the time: 30 minutes was used in counciling the patient about diagnosis and treatment plan    Ms. Airam Quinteros has a reminder for a \"due or due soon\" health maintenance. I have asked that she contact her primary care provider for follow-up on this health maintenance.

## 2022-02-03 ENCOUNTER — TELEPHONE (OUTPATIENT)
Dept: SURGERY | Age: 47
End: 2022-02-03

## 2022-02-03 NOTE — TELEPHONE ENCOUNTER
Called pt to schedule her one month weight check apt with Dr Dawson Calvert. Needs to lose 20-25 lbs before submitting to insurance. No answer, lvm to return call.

## 2022-02-24 ENCOUNTER — OFFICE VISIT (OUTPATIENT)
Dept: SURGERY | Age: 47
End: 2022-02-24

## 2022-02-24 DIAGNOSIS — E66.01 MORBID OBESITY (HCC): Primary | ICD-10-CM

## 2022-02-26 NOTE — PROGRESS NOTES
[facility] at [facility]  Supervised Weight Loss     Date:   2022    Patient's Name: Princess Macias  : 1975    Insurance:  [place]         Session: 10 of  6  Surgery: Gastric Bypass                  Surgeon: Ricardo Boss MD     Height: 65\"     Reported WHEZUY:  967   UTC                         VOL: 13          Pounds Lost since last month: 0               Pounds Gained since last month: 8 lbs     Starting Weight: 321#                       Previous Months Weight: 332#  Overall Pounds Lost: 19 lbs  Overall Pounds Gained: 0          Other Pertinent Information: Today's appointment was completed in a virtual setting d/t COVID-19. Smoking Status:  None  Alcohol Intake: 2 drinks, 1x week    I have reviewed with pt the guidelines of the supervised wt loss program.  Pt understands the expectations of some wt loss during the program and that wt gain could delay the process. I have also explained that appointments need to be consecutive and missing an appointment may result in starting over. Pt has received this information in writing. Changes that patient has made since last month include:  Drinking more water, chewing slowly; mindful of feeling full to stop eating large portions. Eating Habits and Behaviors  General healthy eating guidelines were also discussed. Pts were instructed that their plate should be made up 1/2 plate coming from non-starchy vegetables, 1/4 coming from lean meat, and 1/4 of their plate coming from carbohydrates, including fruits, starches, or milk. We discussed measuring meals to 1/2 cup total per meal after surgery. Drinking only calorie-free, sugar-free and non-carbonated beverages. We discussed the importance of drinking 64 ounces of fluid per day to prevent dehydration post-operatively.                  Physical Activity/Exercise  We talked about the importance of increasing daily physical activity and beginning to develop an exercise regimen/routine. We talked about exercise as being an important part of long term weight loss after surgery. Comments:  During class, I discussed with patient the importance of getting into an exercise routine. Pt is currently not exercising due to ankle injury. Pt has been encouraged to restart exercise when ankle healed. Behavior Modification    A behavior modification lesson was provided with an emphasis on developing mindful eating behaviors. We talked about how to eat more mindfully and identify emotional eating triggers. Tips and recommendations for how to make these changes were provided. Pt was encouraged to keep a food journal and record what they were taking in daily. Patient's reported eating behaviors: sometimes emotionally eating      Overall Assessment: Pt demonstrates appropriate lifestyle changes evidenced by reported changes and but weight gain greater than starting weight. Demonstrates understanding of nutrition guidelines for bariatric surgery. Appears to be an appropriate candidate at this time. Will follow up with surgeon last month, focusing on weight loss.     Patient-Set Goals:   1. Nutrition - work on protein consumption and portion sizes; count grams of protein  2. Exercise - use fitbit and hold self accountable for walking daily  3.  Behavior -let past go and focus on reasons for doing surgery    Shira Anguiano, MY  2/24/2022

## 2022-03-29 ENCOUNTER — OFFICE VISIT (OUTPATIENT)
Dept: SURGERY | Age: 47
End: 2022-03-29

## 2022-03-29 DIAGNOSIS — E66.01 MORBID OBESITY (HCC): Primary | ICD-10-CM

## 2022-03-30 NOTE — PROGRESS NOTES
[facility] at [facility]  Supervised Weight Loss     Date:   3/30/2022    Patient's Name: Chivo Scott  : 1975    Insurance:  [place]         Session:   Surgery: Gastric Bypass                  Surgeon: Ren Tong MD     Height: 65\"     Reported Weight:  333   Lbs                         BMI: 43          Pounds Lost since last month: 7 lbs               Pounds Gained since last month: 0     Starting Weight: 321#                       Previous Months Weight: 340#  Overall Pounds Lost: 12 lbs  Overall Pounds Gained: 0       Other Pertinent Information: Today's appointment was completed in a virtual setting d/t COVID-19. Pt working on weight loss- surgeon recommending pt be around 320 lbs before surgery. Smoking Status: None  Alcohol Intake: 2 drinks, 1x week    I have reviewed with pt the guidelines of the supervised wt loss program.  Pt understands the expectations of some wt loss during the program and that wt gain could delay the process. I have also explained that appointments need to be consecutive and missing an appointment may result in starting over. Pt has received this information in writing. Changes that patient has made since last month include:  Walking, drinking water      Eating Habits and Behaviors  General healthy eating guidelines were discussed. A nutrition lesson was presented on portion control. Patients were instructed implement portion control now using the balanced plate method (1/2 plate non-starchy vegetables, 1/4 plate lean meat, and 1/4 plate whole grains and to include fruit and/or milk at meals or snack). We discussed measuring meals to 1/2 cup total per meal after surgery and appropriate portion progression long term. Patient's current diet habits include: Pt is eating 2-3 meals per day. Snack choices include celery, apples, popcorn.  Pt is eating refined carbohydrate foods (bread, pasta, rice, potatoes) 1x day. Pt is not eating sweets/desserts. Pt is using baking, grillng and broiling cooking methods. Pt is eating meals prepared outside of the home 1-3x week. Pt is drinking water. Pt reports emotional eating. Physical Activity/Exercise  We talked about the importance of increasing daily physical activity and beginning to develop an exercise regimen/routine. We talked about exercise as being an important part of long term weight loss after surgery. Comments:  During class, I discussed with patient the importance of getting into an exercise routine. Pt is currently walking 5 days a week for 30 min for activity. Pt has been encouraged to continue with current exercise. Behavior Modification       We talked about how to eat more mindfully. Tips and recommendations for how to make these changes were provided. Pt was encouraged to keep a food journal and record what they were taking in daily. Overall Assessment: Pt demonstrates appropriate lifestyle changes evidenced by reported changes and weight loss. Demonstrates understanding of nutrition guidelines for bariatric surgery. Appears to be an appropriate candidate at this time. Will follow up next month, focusing on weight loss to achieve goal wt pre-surgery.      Patient-Set Goals:   1. Nutrition - use portion control at meals- order portion control tools  2. Exercise - walk 5x week- join step bet for motivation and consistency; get new walking shoes  3.  Behavior -meditate to keep stress down- focus on eating goals    Nabila Noriega, MY  3/30/2022

## 2022-04-29 ENCOUNTER — CLINICAL SUPPORT (OUTPATIENT)
Dept: SURGERY | Age: 47
End: 2022-04-29

## 2022-04-29 DIAGNOSIS — E66.01 MORBID OBESITY (HCC): Primary | ICD-10-CM

## 2022-04-29 NOTE — PROGRESS NOTES
[facility] at [facility]  Supervised Weight Loss     Date:   2022    Patient's Name: Teresa Garcia  : 1975    Insurance:  [place]           Session:   Surgery: Gastric Bypass                  Surgeon: MD Tomasa Hunter Reported M   TLP                         FEE: 74          Pounds Lost since last month: 0              Pounds Gained since last month: 0     Starting Weight: 321#                       Previous Months Weight: 333#  Overall Pounds Lost: 12 lbs   Overall Pounds Gained: 0        Other Pertinent Information: Today's appointment was completed over the phone. Pt still working on pre-op wt loss requirements based on initial starting wt from 2020 of 315#. Attributes difficulty with losing wt d/t mother's passing in 2021 and later traveled to [facility] after the  and got COVID while there and  was hospitalized for 10 days. Has had difficulty with getting back on track since then. Also reports working from home makes it difficult to focus on healthy lifestyle changes. Recent office wt was 340# with Dr. Linette Yanez on 2022. Pt requested 1:1 appointment for today's session in place of group class setting for more individualized guidance. Smoking Status:  None (quit 2021)   Alcohol Intake: 2 drinks, once per week    I have reviewed with pt the guidelines of the supervised wt loss program.  Pt understands the expectations of some wt loss during the program and that wt gain could delay the process. I have also explained that appointments need to be consecutive and missing an appointment may result in starting over. Pt has received this information in writing. Changes that patient has made since last month include:  No new changes reported this past month. Eating Habits and Behaviors  General healthy eating guidelines were also discussed.  Pts were instructed that their plate should be made up 1/2 plate coming from non-starchy vegetables, 1/4 coming from lean meat, and 1/4 of their plate coming from carbohydrates, including fruits, starches, or milk. We discussed measuring meals to 1/2 cup total per meal after surgery. Drinking only calorie-free, sugar-free and non-carbonated beverages. We discussed the importance of drinking 64 ounces of fluid per day to prevent dehydration post-operatively. Patient's current diet habits include: Pt is eating 2-3 meals per day. Has Beach Body protein powder and uses PRN. Lunch is salad. Krystal  is prepared at home. Snack choices include carrots and celery with peanut butter or nuts or sometimes \"kid snacks\" like chips and junk. Pt is eating refined carbohydrate foods (bread, pasta, rice, potatoes) in moderation. Pt is eating sweets/desserts in moderation. Pt is using healthy cooking methods. Pt is eating meals prepared outside of the home once per week (pizza on Friday). Pt is drinking water, coffee, Crystal Light. No sodas. Pt reports no to emotional eating. Physical Activity/Exercise  An exercise presentation was provided including information about exercise programs available both before and after surgery. We talked about the importance of increasing daily physical activity and beginning to develop an exercise regimen/routine. We talked about exercise as being an important part of long term weight loss after surgery. Comments:  During class, I discussed with patient the importance of getting into an exercise routine. Pt is currently not exercising d/t ankle pain and swelling that limits. Pt has been encouraged to try chair exercises or short segments of walking as that is better tolerated. Behavior Modification       We talked about how to eat more mindfully. Tips and recommendations for how to make these changes were provided. Pt was encouraged to keep a food journal and record what they were taking in daily. Overall Assessment: Pt demonstrates some small changes with continued changes still indicated to promote wt loss. Will continue to assess. Patient-Set Goals:   1. Nutrition - modified version of liver shrinking diet (emailed guidelines) with 1 meal per day replaced with a protein shake and 2 sensible meals as recommended  2. Exercise - chair exercises and short segments of walking   3.  Behavior -review liver shrinking diet guidelines emailed; planning and meal prepping for the week ahead     Tyra Gregory, [address]  4/29/2022

## 2022-05-17 ENCOUNTER — CLINICAL SUPPORT (OUTPATIENT)
Dept: SURGERY | Age: 47
End: 2022-05-17

## 2022-05-17 DIAGNOSIS — E66.01 MORBID OBESITY (HCC): Primary | ICD-10-CM

## 2022-05-17 NOTE — PROGRESS NOTES
[facility] at [facility]  Supervised Weight Loss     Date:   2022    Patient's Name: Savi Culp  : 1975    Insurance:  [place]                           Session:   Surgery: Gastric Bypass                  Surgeon: MD Isabel Lopez            Reported YO:  765   GNB                         IXH: 96          Pounds Lost since last month: 0              Pounds Gained since last month: 2     Starting Weight: 321#                       Previous Months Weight: 333#  Overall Pounds Lost: 0 lbs             Overall Pounds Gained: 14#        Other Pertinent Information: Pt still working on pre-op wt loss requirements based on initial starting wt from 2020 of 315#. Today's appointment was completed over the phone. States things have been \"not great\" this past month. Was tracking food intake and meal patterns. States she gets off track with a special occasion, travel, holiday (Mother's Day and trip to the beach this past month). Recent office wt was 340# with Dr. Lenore Leggett on 2022. Pt requested 1:1 appointment for today's session in place of group class setting for more individualized guidance. Smoking Status:  none  Alcohol Intake: none    I have reviewed with pt the guidelines of the supervised wt loss program.  Pt understands the expectations of some wt loss during the program and that wt gain could delay the process. I have also explained that appointments need to be consecutive and missing an appointment may result in starting over. Pt has received this information in writing. Changes that patient has made since last month include:  Tracking intake/self-monitoring. Eating Habits and Behaviors  A nutrition lesson was presented on label reading with specific guidelines provided for limiting added sugars.  This information will help increase healthy food choices, promote weight loss and prevent dumping syndrome after gastric bypass. We also reviewed the general nutrition guidelines for bariatric surgery. Patient's current diet habits include: Pt is eating 2-3 meals per day. Snack choices include pickles, apples. Pt is eating refined carbohydrate foods (bread, pasta, rice, potatoes) in moderation. Focusing on protein at most meals. Pt is eating sweets/desserts in moderation. Pt is drinking water, coffee. Pt reports yes to emotional eating. Physical Activity/Exercise  We talked about the importance of increasing daily physical activity and beginning to develop an exercise regimen/routine. We talked about exercise as being an important part of long term weight loss after surgery. Comments:  During class, I discussed with patient the importance of getting into an exercise routine. Pt is currently not exercising and is mostly sedentary for her job. States she is active with her kid's activities but lacks more intentional and consistent exercise. Pt has been encouraged to aim for 150 minutes of exercise per week. Behavior Modification       We talked about how to eat more mindfully. Tips and recommendations for how to make these changes were provided. Pt was encouraged to keep a food journal and record what they were taking in daily. Overall Assessment: Pt demonstrates continued difficulty with making more consistent lifestyle and behavior changes. Multiple recommendations and tips provided today. We discussed the importance of balanced approach to healthy eating vs all or nothing. Recommended focusing on self-talk and practicing 90/10 rule. Will continue to assess. Patient-Set Goals:   1. Nutrition - more consistent meal patterns with 3 meals a day and 1-2 planned snack options, eating every 3-4 hours  2. Exercise - track exercise in minutes and aim for 150 minutes per week, do not track daily steps as part of exercise  3.  Behavior -balance/moderation mindset vs \"all or nothing\"    [facility] Franca Garay, [address] [place] [address]  5/17/2022

## 2022-06-15 ENCOUNTER — TELEPHONE (OUTPATIENT)
Dept: SURGERY | Age: 47
End: 2022-06-15

## 2022-06-15 ENCOUNTER — CLINICAL SUPPORT (OUTPATIENT)
Dept: SURGERY | Age: 47
End: 2022-06-15

## 2022-06-15 DIAGNOSIS — E66.01 MORBID OBESITY (HCC): Primary | ICD-10-CM

## 2022-06-15 NOTE — TELEPHONE ENCOUNTER
Patient calling to check the status of her bariatric surgery requirements. Everything is completed. Jessicawatson Zhanna would like to see her 1-2 more times before scheduling a f/u with Dr. Linette Yanez. Told the pt to call me after these appts and we can schedule.

## 2022-06-15 NOTE — PROGRESS NOTES
[facility] at [facility]  Supervised Weight Loss     Date:   6/15/2022    Patient's Name: Tali Figueroa  : 1975    Insurance:  [place]                           Session:   Surgery: Gastric Bypass                  Surgeon: Alon Landry MD     Height: 65\"                 Reported Wt:  328   Lbs                         BMI: 04          Pounds Lost since last month: 7              Pounds Gained since last month: 2     Starting Weight: 321#                       Previous Months Weight: 335#  Overall Pounds Lost: 0 lbs             Overall Pounds Gained: 7#        Other Pertinent Information: Pt still working on pre-op wt loss requirements based on initial starting wt from 2020 of 315#. Today's appointment was completed over the phone. States she has had difficulty with implementing consistent lifestyle changes d/t busy work and life balance and schedules. States this past month has been focused more on meal planning and making more time for herself. Pt with initial consult with Dr. Wanda Osgood in 2020 and started nutrition visits in May 2021. Smoking Status:  none  Alcohol Intake:  none    I have reviewed with pt the guidelines of the supervised wt loss program.  Pt understands the expectations of some wt loss during the program and that wt gain could delay the process. I have also explained that classes need to be consecutive. Missing a class may result in starting over. Pt has received this information in writing. Changes that patient has made since last month include:  Meal prepping to help limit reliance on fast food and convenience foods. Increased fluid intake (64 oz daily). Increased exercise. Eating Habits and Behaviors  General healthy eating guidelines were discussed. A nutrition lesson specific to the importance of protein intake after surgery was provided.  We discussed food sources of protein, protein supplements and multiple reasons as to why protein is important after bariatric surgery. Pts were instructed to focus on including protein at every meal and practice eating protein first at the meal. Pts were encouraged to sample a protein shake for tolerance. Patients were also instructed to use the balanced plate method for help with portion control and general healthy eating prior to surgery. We discussed measuring meals to 1/2 cup total per meal after surgery. Drinking only calorie-free, sugar-free and non-carbonated beverages. We discussed the importance of drinking 64 ounces of fluid per day to prevent dehydration post-operatively. Patient's current diet habits include: Pt is eating 3 meals per day. Plans to start using 1 shake per day in the next week. Pt is drinking 64 oz fluids. Physical Activity/Exercise  We talked about the importance of increasing daily physical activity and beginning to develop an exercise regimen/routine. We talked about exercise as being an important part of long term weight loss after surgery. Comments:  During class, I discussed with patient the importance of getting into an exercise routine. Pt is currently walking for activity. Pt has been encouraged to maintain and increase as tolerated. Behavior Modification       We talked about how to eat more mindfully. Tips and recommendations for how to make these changes were provided. Pt was encouraged to keep a food journal and record what they were taking in daily. Overall Assessment: Pt demonstrates small/appropriate lifestyle changes evidenced by reported changes and reported wt loss. Will continue to assess. Have recommended pt to complete 1-2 more nutrition visits and then to follow up with Dr. Shaheed Rodriguez. Patient-Set Goals:   1. Nutrition - protein focused meals and snacks (emailed handouts) eat protein first at the meals   2. Exercise - maintain walking and increase as tolerated   3.  Behavior -discuss support from family, food journal for self-monitoring      Tyra Gregory, [address]  6/15/2022

## 2022-07-15 ENCOUNTER — CLINICAL SUPPORT (OUTPATIENT)
Dept: SURGERY | Age: 47
End: 2022-07-15

## 2022-07-15 DIAGNOSIS — E66.01 MORBID OBESITY (HCC): Primary | ICD-10-CM

## 2022-07-15 NOTE — PROGRESS NOTES
[facility] at [facility]  Supervised Weight Loss     Date:   7/15/2022    Patient's Name: Deuce Ballard  : 1975    Insurance:  [place]                           Session: 15 of  6  Surgery: Gastric Bypass                  Surgeon: Lorenzo Dong MD     Height: 65\"                 Reported Wt:  325   Lbs                         BMI: 75          Pounds Lost since last month: 3              Pounds Gained since last month: 0     Starting Weight: 321#                       Previous Months Weight: 328#  Overall Pounds Lost: 0 lbs             Overall Pounds Gained: 4#        Other Pertinent Information: Pt still working on pre-op wt loss requirements based on initial starting wt from 2020 of 315#. Today's appointment was completed over the phone. States she has had difficulty with implementing consistent lifestyle changes d/t busy work and life balance and schedules. States this past month has been focused more on meal planning and making more time for herself. Pt with initial consult with Dr. Melody Gonzalez in 2020 and started nutrition visits in May 2021. Pt recently had COVID this past month \"in the bed for 4 days\" and resulted in decreased exercise. Smoking Status:  none  Alcohol Intake: none    I have reviewed with pt the guidelines of the supervised wt loss program.  Pt understands the expectations of some wt loss during the program and that wt gain could delay the process. I have also explained that appointments need to be consecutive and missing an appointment may result in starting over. Pt has received this information in writing. Changes that patient has made since last month include:  1 protein shake per day. Eating Habits and Behaviors  A nutrition lesson specific to vitamins was provided. We discussed the various reasons for needing vitamins and different types and doses. General healthy eating guidelines were also discussed.  Pts were instructed that their plate should be made up 1/2 plate coming from non-starchy vegetables, 1/4 coming from lean meat, and 1/4 of their plate coming from carbohydrates, including fruits, starches, or milk. We discussed measuring meals to 1/2 cup total per meal after surgery. Drinking only calorie-free, sugar-free and non-carbonated beverages. We discussed the importance of drinking 64 ounces of fluid per day to prevent dehydration post-operatively. Patient's current diet habits include: Pt is eating 3 meals per day. Breakfast is eggs. Lunch is a protein shake. Snack choices include fruit, celery, carrots, walnuts. Dinner is meat/protein and vegetables. Pt is eating refined carbohydrate foods (bread, pasta, rice, potatoes) in moderation. Pt is eating meals prepared outside of the home 1-3 times per week. Pt is drinking 64 oz per day and mostly water, Crystal Light and coffee. Physical Activity/Exercise  We talked about the importance of increasing daily physical activity and beginning to develop an exercise regimen/routine. We talked about exercise as being an important part of long term weight loss after surgery. Comments:  During class, I discussed with patient the importance of getting into an exercise routine. Pt recently enrolled in a gym but has been limited recently d/t COVID infection. Behavior Modification       We talked about how to eat more mindfully and identify emotional eating triggers. Tips and recommendations for how to make these changes were provided. Pt was encouraged to keep a food journal and record what they were taking in daily. Overall Assessment: Pt demonstrates small/approrpiate lifestyle changes evidenced by reported changes. Will continue with final nutrition visit next month via phone. Pt has been encouraged to proceed with next steps after next month's visit as it has been over a year since initial evaluation.  Can still attend [employer] classes while waiting for approval process. Patient-Set Goals:   1. Nutrition - continue with 3 meals a day with protein at each meal   2. Exercise - implement exercise as tolerated   3.  Behavior -practice not drinking with meals (30/30 rule); print and review vitamin information provided today     Janel Hayes, MY  7/15/2022

## 2022-08-16 ENCOUNTER — CLINICAL SUPPORT (OUTPATIENT)
Dept: SURGERY | Age: 47
End: 2022-08-16

## 2022-08-16 DIAGNOSIS — E66.01 MORBID OBESITY (HCC): Primary | ICD-10-CM

## 2022-08-16 NOTE — PROGRESS NOTES
[employer] [facility] at [facility]  Supervised Weight Loss     Date:   2022    Patient's Name: Mandi Franks  : 1975    Insurance:  [employer]                            Session:   Surgery: Gastric Bypass                  Surgeon:  Cheryl Valera MD     Height: 65\"                 Reported Wt:  325   Lbs                         BMI: 53          Pounds Lost since last month: 0              Pounds Gained since last month: 0     Starting Weight: 321#                       Previous Months Weight: 325#  Overall Pounds Lost: 0 lbs               Overall Pounds Gained: 4#        Other Pertinent Information: Pt continues working on pre-op wt loss requirements based on initial starting wt of 315# (2020). Pt gained wt since during the program up to 340# at the time of updated office visit with Dr. Kaiden Mccain (22). At that time he recommended wt loss down to 315-320# in order to be approved. Pt has continued to have difficulty achieving wt loss d/t multiple barriers including time management (work/life balance) and poor support (states  is an alcoholic). Has been unable to implement more significant and consistent lifestyle changes. Multiple limitations with starting exercise in the past 2-3 months including having COVID last month and now a recent ankle injury. Pt reports recently being offered a new job which should result in improved work hours. Pt is still unsure of readiness for weight loss surgery. I have reviewed with pt the guidelines of the supervised wt loss program.  Pt understands the expectations of some wt loss during the program and that wt gain could delay the process. I have also explained that appointments need to be consecutive and missing an appointment may result in starting over. Pt has received this information in writing. Changes that patient has made since last month include:  No lifestyle changes made this past month.       Eating Habits and Behaviors  General healthy eating guidelines were also discussed. Pts were instructed that their plate should be made up 1/2 plate coming from non-starchy vegetables, 1/4 coming from lean meat, and 1/4 of their plate coming from carbohydrates, including fruits, starches, or milk. We discussed measuring meals to 1/2 cup total per meal after surgery. Drinking only calorie-free, sugar-free and non-carbonated beverages. We discussed the importance of drinking 64 ounces of fluid per day to prevent dehydration post-operatively. Physical Activity/Exercise  We talked about the importance of increasing daily physical activity and beginning to develop an exercise regimen/routine. We talked about exercise as being an important part of long term weight loss after surgery. Comments:  During class, I discussed with patient the importance of getting into an exercise routine. Pt is currently not exercising d/t recent injury. Pt has been encouraged to resume when able. Behavior Modification       A behavior modification lesson was provided with an emphasis on developing mindful eating behaviors. We talked about how to eat more mindfully and identify emotional eating triggers. Tips and recommendations for how to make these changes were provided. Pt was encouraged to keep a food journal and record what they were taking in daily. Overall Assessment: Pt has completed required appointments and evaluations per insurance requirements for weight loss surgery approval. Continues to struggle with making more consistent and significant lifestyle changes d/t multiple barriers. Pt is uncertain if she wants to move forward with surgery approval and is going to take some time to decide how she wants to proceed. In the meantime have recommended she consider counseling/therapy and attending our program support group. We also discussed the medical weight loss program as an alternative option.  Should the pt move forward with surgery, pt has fulfilled the required nutrition visits and would be recommended with some reservation more from a behavioral/emotional standpoint as these are her primary barriers for making more significant lifestyle changes to promote wt loss. Pt would also still need to meet weight loss goals. Can follow up with nutrition PRN.      Meghan Valentine, RD  8/16/2022

## 2022-09-06 ENCOUNTER — OFFICE VISIT (OUTPATIENT)
Dept: ORTHOPEDIC SURGERY | Age: 47
End: 2022-09-06
Payer: COMMERCIAL

## 2022-09-06 VITALS — BODY MASS INDEX: 47.09 KG/M2 | WEIGHT: 293 LBS | HEIGHT: 66 IN

## 2022-09-06 DIAGNOSIS — M79.671 RIGHT FOOT PAIN: ICD-10-CM

## 2022-09-06 DIAGNOSIS — S92.351A CLOSED FRACTURE OF BASE OF FIFTH METATARSAL BONE OF RIGHT FOOT, INITIAL ENCOUNTER: Primary | ICD-10-CM

## 2022-09-06 PROCEDURE — 28470 CLTX METATARSAL FX WO MNP EA: CPT | Performed by: ORTHOPAEDIC SURGERY

## 2022-09-06 PROCEDURE — 99202 OFFICE O/P NEW SF 15 MIN: CPT | Performed by: ORTHOPAEDIC SURGERY

## 2022-09-06 RX ORDER — SERTRALINE HYDROCHLORIDE 100 MG/1
TABLET, FILM COATED ORAL
COMMUNITY

## 2022-09-06 RX ORDER — DESOGESTREL AND ETHINYL ESTRADIOL 0.15-0.03
1 KIT ORAL DAILY
COMMUNITY
Start: 2022-08-28

## 2022-09-06 NOTE — PROGRESS NOTES
Iliana Kumar (: 1975) is a 40 y.o. female, patient,here for evaluation of the following   Chief Complaint   Patient presents with    Foot Pain        ASSESSMENT/PLAN:  Below is the assessment and plan developed based on review of pertinent history, physical exam, labs, studies, and medications. 1. Closed fracture of base of fifth metatarsal bone of right foot, initial encounter  -     [facility] 24  2. Right foot pain  -     XR STANDING FOOT RT MIN 3 V; Future      Patient is informed of findings on exam, she has a base of fifth metatarsal, will continue with the boot immobilization, provided a parking placard, weightbearing as tolerated, activity modification. Next time she returns, we will obtain new x-rays of the right foot 3 views nonweightbearing. Return in about 4 weeks (around 10/4/2022) for repeat xrays. Allergies   Allergen Reactions    Other Food Nausea and Vomiting     Oysters causes nausea &  vomiting       Current Outpatient Medications   Medication Sig    sertraline (Zoloft) 100 mg tablet Zoloft 100 mg tablet   Take 1 tablet every day by oral route. Isibloom 0.15-0.03 mg tab Take 1 Tablet by mouth daily. lisinopriL (PRINIVIL, ZESTRIL) 5 mg tablet Take 5 mg by mouth daily. Shanna 0.35 mg tab     oxyCODONE-acetaminophen (PERCOCET) 5-325 mg per tablet Take 1 Tab by mouth every four (4) hours as needed for Pain. Max Daily Amount: 6 Tabs. (Patient not taking: Reported on 2022)     No current facility-administered medications for this visit.        Past Medical History:   Diagnosis Date    Chlamydia     Essential hypertension     Psychiatric problem        Past Surgical History:   Procedure Laterality Date    HX GYN      2 vaginal births    HX GYN  15    DILATATION AND CURETTAGE WITH SUCTION / SEND PATH FOR FROZEN SECTION     HX OTHER SURGICAL      ovarian cyst 2007       Family History   Problem Relation Age of Onset    Diabetes Father     Hypertension Father     Stroke Father     Cancer Maternal Grandmother     Cancer Paternal Aunt        Social History     Socioeconomic History    Marital status:      Spouse name: Not on file    Number of children: Not on file    Years of education: Not on file    Highest education level: Not on file   Occupational History    Not on file   Tobacco Use    Smoking status: Some Days     Packs/day: 0.25     Types: Cigarettes    Smokeless tobacco: Never   Substance and Sexual Activity    Alcohol use: Yes     Comment: occas    Drug use: No    Sexual activity: Yes     Partners: Male     Birth control/protection: None   Other Topics Concern    Not on file   Social History Narrative    Not on file     Social Determinants of Health     Financial Resource Strain: Not on file   Food Insecurity: Not on file   Transportation Needs: Not on file   Physical Activity: Not on file   Stress: Not on file   Social Connections: Not on file   Intimate Partner Violence: Not on file   Housing Stability: Not on file           Vitals:  Ht 5' 6\" (1.676 m)   Wt 340 lb (154.2 kg)   BMI 54.88 kg/m²    Body mass index is 54.88 kg/m². SUBJECTIVE:  Rachel Kurtz (: 1975)   New patient presents today with complaint of right foot pain that occurred on 2022, she felt while in the parking lot injuring the right foot. Describes a twisting injury. She has been limiting weightbearing for the past 2 weeks and applying ice and elevating which has helped symptoms. Overall improving, no other complaints today. OBJECTIVE EXAM:     Visit Vitals  Ht 5' 6\" (1.676 m)   Wt 340 lb (154.2 kg)   BMI 54.88 kg/m²       Appearance: Alert, well appearing and pleasant patient who is in no distress, oriented to person, place/time, and who follows commands. This patient is accompanied in the       office by her  self. Psychiatric: Affect and mood are appropriate.  No dementia noted on examination  Musculoskeletal:  LOCATION: Diffuse tenderness to palpation base of fifth metatarsal at foot - right      Integumentary: No rashes, skin patches, wounds, or abrasions to the right or left legs       Warm and normal color. No regions of expressible drainage. Gait: Normal      Tenderness: No tenderness        Motor/Strength/Tone Exam: Normal       Sensory Exam:   Intact Normal Sensation to ankle/foot      Stability Testing: No anterolateral or varus instability of the Ankle or Subtalar Joints               No peroneal tendon instability noted      ROM: Normal ROM noted to ankle/foot      Contractures: No Achilles or Gastrocnemius Contractures      Calf tenderness: Absent for calf or gastrocnemius muscle regions       Soft, supple, non tender, non taut lower extremity compartment  Alignment:      NEUTRAL Hindfoot,         none Metatarsus Adductus Metatarsus   Wounds/Abrasions:    None present  Extremities:   No embolic phenomena to the toes          No significant edema to the foot and or toes. Lower extremities are warm and appear well perfused    DVT: No evidence of DVT seen on examination at this time     No calf swelling, no tenderness to calf muscles  Lymphatic:  No Evidence of Lymphedema  Vascular: Medial Border of Tibia Region: Edema is not present         Pulses: Dorsalis Pedis &  Posterior Tibial Pulses : Palpable yes        Varicosities Lower Limbs :  None  noted  Neuro: Negative bilateral Straight leg raise (seated position)    See Musculoskeletal section for pertinent individual extremity examination    No abnormal hand/wrist, foot/ankle, or facial/neck tremors. Lower Extremity/Ankle/Foot:  Antalgic gait, satisfactory weightbearing stance. Right lower extremity/ankle: Calves are soft nontender, ligaments are stable, full active and passive range of motion intact for dorsiflexion/plantarflexion, and eversion and inversion. No tenderness along the peroneal tendons.   Negative Jamil test, negative ankle squeeze test.    Right foot: There is tenderness palpation at base of fifth metatarsal, there is swelling associated. The rest of the fifth metatarsal nontender, other metatarsals also nontender, Lisfranc joint, cuboid and navicular nontender. Able to flex and extend toes suspect range of motion strength 5/5. Contralateral lower extremity/ankle /foot exam:  Nontender, no swelling ligaments grossly stable. Normal weightbearing stance. Neurovascular exam intact light touch sensation, cap refill, 2+DP pulse, motor 5/5. Imaging:    XR Results (most recent):  Results from Appointment encounter on 09/06/22    XR STANDING FOOT RT MIN 3 V    Narrative  Right foot standing AP, lateral and oblique x-rays show a base of fifth metatarsal fracture on the oblique view, the fracture is minimally displaced, no other fractures or dislocations seen on these views. An electronic signature was used to authenticate this note.   -- Ashley Oviedo MD

## 2022-09-06 NOTE — LETTER
9/9/2022    Patient: Alonso Tan   YOB: 1975   Date of Visit: 9/6/2022     [address] [id number]  Via Fax: [phone]    Dear Souleymane Zaragoza MD,      Thank you for referring Ms. Esther Barragan to [facility] for evaluation. My notes for this consultation are attached. If you have questions, please do not hesitate to call me. I look forward to following your patient along with you.       Sincerely,    Helen Hurtado MD

## 2022-10-11 ENCOUNTER — OFFICE VISIT (OUTPATIENT)
Dept: ORTHOPEDIC SURGERY | Age: 47
End: 2022-10-11
Payer: COMMERCIAL

## 2022-10-11 VITALS — BODY MASS INDEX: 47.09 KG/M2 | HEIGHT: 66 IN | WEIGHT: 293 LBS

## 2022-10-11 DIAGNOSIS — S92.351D CLOSED FRACTURE OF BASE OF FIFTH METATARSAL BONE WITH ROUTINE HEALING, RIGHT: Primary | ICD-10-CM

## 2022-10-11 DIAGNOSIS — M79.671 RIGHT FOOT PAIN: ICD-10-CM

## 2022-10-11 PROCEDURE — L4387 NON-PNEUM WALK BOOT PRE OTS: HCPCS | Performed by: ORTHOPAEDIC SURGERY

## 2022-10-11 PROCEDURE — 99024 POSTOP FOLLOW-UP VISIT: CPT | Performed by: ORTHOPAEDIC SURGERY

## 2022-10-11 NOTE — LETTER
10/11/2022    Patient: Zach Romero   YOB: 1975   Date of Visit: 10/11/2022     [address] [id number]  Via Fax: [phone]    Dear Ajith Avelar MD,      Thank you for referring Ms. Samul Hammans to [facility] for evaluation. My notes for this consultation are attached. If you have questions, please do not hesitate to call me. I look forward to following your patient along with you.       Sincerely,    Danielle Schumacher MD

## 2022-10-11 NOTE — PROGRESS NOTES
Alli Espinoza (: 1975) is a 40 y.o. female, patient,here for evaluation of the following   Chief Complaint   Patient presents with    Foot Pain    Follow-up        ASSESSMENT/PLAN:  Below is the assessment and plan developed based on review of pertinent history, physical exam, labs, studies, and medications. 1. Closed fracture of base of fifth metatarsal bone with routine healing, right  -     XR FOOT RT MIN 3 V; Future  -     TX NON-PNEUM WALK BOOT PRE OTS  -     REFERRAL TO DME  2. Right foot pain  -     XR FOOT RT MIN 3 V; Future  -     TX NON-PNEUM WALK BOOT PRE OTS  -     REFERRAL TO DME      Patient is informed of findings on exam and x-rays, she continues to have pain with deep palpation and her x-rays that show healing but not completely healed. She continues to depend on the short boot that she is using to walk. The boot is worn out so working to try to give her a new one today, patient will continue weightbearing as tolerated in the boot and return in approximately 6 weeks at which time we will get new x-rays of right foot 3 views nonweightbearing. Return in about 6 weeks (around 2022) for repeat xrays. Allergies   Allergen Reactions    Other Food Nausea and Vomiting     Oysters causes nausea &  vomiting       Current Outpatient Medications   Medication Sig    Isibloom 0.15-0.03 mg tab Take 1 Tablet by mouth daily. sertraline (Zoloft) 100 mg tablet Zoloft 100 mg tablet   Take 1 tablet every day by oral route. lisinopriL (PRINIVIL, ZESTRIL) 5 mg tablet Take 5 mg by mouth daily. Shanna 0.35 mg tab      No current facility-administered medications for this visit.        Past Medical History:   Diagnosis Date    Chlamydia     Essential hypertension     Psychiatric problem        Past Surgical History:   Procedure Laterality Date    HX GYN      2 vaginal births    HX GYN  15    DILATATION AND CURETTAGE WITH SUCTION / SEND PATH FOR FROZEN SECTION     HX OTHER SURGICAL ovarian cyst 2007       Family History   Problem Relation Age of Onset    Diabetes Father     Hypertension Father     Stroke Father     Cancer Maternal Grandmother     Cancer Paternal Aunt        Social History     Socioeconomic History    Marital status:      Spouse name: Not on file    Number of children: Not on file    Years of education: Not on file    Highest education level: Not on file   Occupational History    Not on file   Tobacco Use    Smoking status: Some Days     Packs/day: 0.25     Types: Cigarettes    Smokeless tobacco: Never   Substance and Sexual Activity    Alcohol use: Yes     Comment: occas    Drug use: No    Sexual activity: Yes     Partners: Male     Birth control/protection: None   Other Topics Concern    Not on file   Social History Narrative    Not on file     Social Determinants of Health     Financial Resource Strain: Not on file   Food Insecurity: Not on file   Transportation Needs: Not on file   Physical Activity: Not on file   Stress: Not on file   Social Connections: Not on file   Intimate Partner Violence: Not on file   Housing Stability: Not on file           Vitals:  Ht 5' 6\" (1.676 m)   Wt 340 lb (154.2 kg)   BMI 54.88 kg/m²    Body mass index is 54.88 kg/m². SUBJECTIVE:  Joey Bhakta (: 1975)   Patient returns today for follow-up of the right base of fifth metatarsal fracture, this injury was from 2022. She has been in a short boot, she continues to heal but still has pain with weightbearing. She continues to depend on the boot to walk otherwise she cannot walk without the boot, results in pain. OBJECTIVE EXAM:     Visit Vitals  Ht 5' 6\" (1.676 m)   Wt 340 lb (154.2 kg)   BMI 54.88 kg/m²       Appearance: Obese female alert, well appearing and pleasant patient who is in no distress, oriented to person, place/time, and who follows commands. This patient is accompanied in the       office by her  self.    Psychiatric: Affect and mood are appropriate. No dementia noted on examination  Musculoskeletal:  LOCATION: Diffuse tenderness deep palpation base of fifth metatarsal foot - right      Integumentary: No rashes, skin patches, wounds, or abrasions to the right or left legs       Warm and normal color. No regions of expressible drainage. Gait: Normal      Tenderness: No tenderness        Motor/Strength/Tone Exam: Normal       Sensory Exam:   Intact Normal Sensation to ankle/foot      Stability Testing: No anterolateral or varus instability of the Ankle or Subtalar Joints               No peroneal tendon instability noted      ROM: Normal ROM noted to ankle/foot      Contractures: No Achilles or Gastrocnemius Contractures      Calf tenderness: Absent for calf or gastrocnemius muscle regions       Soft, supple, non tender, non taut lower extremity compartment  Alignment:      NEUTRAL Hindfoot,         none Metatarsus Adductus Metatarsus   Wounds/Abrasions:    None present  Extremities:   No embolic phenomena to the toes          No significant edema to the foot and or toes. Lower extremities are warm and appear well perfused    DVT: No evidence of DVT seen on examination at this time     No calf swelling, no tenderness to calf muscles  Lymphatic:  No Evidence of Lymphedema  Vascular: Medial Border of Tibia Region: Edema is not present         Pulses: Dorsalis Pedis &  Posterior Tibial Pulses : Palpable yes        Varicosities Lower Limbs :  None  noted  Neuro: Negative bilateral Straight leg raise (seated position)    See Musculoskeletal section for pertinent individual extremity examination    No abnormal hand/wrist, foot/ankle, or facial/neck tremors. Lower Extremity/Ankle/Foot:  Antalgic gait, satisfactory weightbearing stance. Right lower extremity/ankle: Full active and passive range of motion intact, nontender, swelling, ligament stable.     Right foot: There is still tenderness to deep palpation fifth metatarsal, rest of foot is nontender. There is still mild swelling over the fifth metatarsal.  No malalignment or deformity. Contralateral lower extremity/ankle /foot exam:  Nontender, no swelling ligaments grossly stable. Normal weightbearing stance. Neurovascular exam intact. Imaging:    XR Results (most recent):  Results from Appointment encounter on 10/11/22    XR FOOT RT MIN 3 V    Narrative  Right foot AP, lateral and oblique nonweightbearing x-rays show the base of fifth metatarsal fracture is mostly healed, position is good and nondisplaced. No other fractures or dislocations seen. Satisfactory bone density. An electronic signature was used to authenticate this note.   -- Yue Aponte MD

## 2022-11-13 NOTE — PROGRESS NOTES
[facility] at [facility]  Supervised Weight Loss     Date:   2021    Patient's Name: Celia Mims  : 1975    Insurance:  [employer]         Session: 3 of  6  Surgery: Gastric Bypass  Surgeon:  Avinash Heard MD    Height: 65\" Reported Weight:    319.5      Lbs. BMI: 52   Pounds Lost since last month: 0               Pounds Gained since last month: 1.5#    Starting Weight: 321#   Previous Months Weight: 318#  Overall Pounds Lost: 2.5# Overall Pounds Gained: 0    Other Pertinent Information: Today's appointment was completed in a virtual setting d/t COVID-Ryla. Smoking Status:  Yes, smoking  Alcohol Intake: 2 drinks, once a week    I have reviewed with pt the guidelines of the supervised wt loss program.  Pt understands the expectations of some wt loss during the program and that wt gain could delay the process. I have also explained that appointments need to be consecutive and missing an appointment may result in starting over. Pt has received this information in writing. Changes that patient has made since last month include:  More water, walking, and start protein shakes. Eating Habits and Behaviors  A nutrition lesson specific to vitamins was provided. We discussed the various reasons for needing vitamins and different types and doses. General healthy eating guidelines were also discussed. Pts were instructed that their plate should be made up 1/2 plate coming from non-starchy vegetables, 1/4 coming from lean meat, and 1/4 of their plate coming from carbohydrates, including fruits, starches, or milk. We discussed measuring meals to 1/2 cup total per meal after surgery. Drinking only calorie-free, sugar-free and non-carbonated beverages. We discussed the importance of drinking 64 ounces of fluid per day to prevent dehydration post-operatively. Patient's current diet habits include: Pt is eating 2-3 meals per day.  Snack choices include cherry tomatoes, cucumbers. Pt is eating refined carbohydrate foods (bread, pasta, rice, potatoes) 3 times per week. Pt is eating sweets/desserts not at all. Pt is using baked, grilled, broiled cooking methods. Pt is eating meals prepared outside of the home 1-3 times per week. Pt is drinking 64 oz water every day, 20 oz sugar free beverages every day. Pt reports emotional eating every day. Physical Activity/Exercise  We talked about the importance of increasing daily physical activity and beginning to develop an exercise regimen/routine. We talked about exercise as being an important part of long term weight loss after surgery. Comments:  During class, I discussed with patient the importance of getting into an exercise routine. Pt is currently walking 3 days per week 15 minutes per day for activity. Pt has been encouraged to maintain and increase as tolerated. Behavior Modification       We talked about how to eat more mindfully and identify emotional eating triggers. Tips and recommendations for how to make these changes were provided. Pt was encouraged to keep a food journal and record what they were taking in daily. Overall Assessment: Pt is making small, appropriate lifestyle changes as evidenced by slow trending weight loss. Pt aware that must be smoke free prior to surgery as outlined in letter sent with insurance requirements, continue to wean. Will continue to monitor. Patient-Set Goals:   1. Nutrition - increase protein with meal prep  2. Exercise -  Increase exercise by tracking with [employer] and aiming for 6000 steps every day and morning walks  3.  Behavior - meditation in the morning    Laura Roman, MS, RD, LDN  7/1/2021 present

## 2023-05-26 RX ORDER — DESOGESTREL AND ETHINYL ESTRADIOL 0.15-0.03
1 KIT ORAL DAILY
COMMUNITY
Start: 2022-08-28

## 2023-05-26 RX ORDER — LISINOPRIL 5 MG/1
5 TABLET ORAL DAILY
COMMUNITY

## 2023-05-26 RX ORDER — SERTRALINE HYDROCHLORIDE 100 MG/1
TABLET, FILM COATED ORAL
COMMUNITY

## 2023-05-26 RX ORDER — ACETAMINOPHEN AND CODEINE PHOSPHATE 120; 12 MG/5ML; MG/5ML
SOLUTION ORAL
COMMUNITY
Start: 2020-08-27